# Patient Record
Sex: FEMALE | Race: WHITE | NOT HISPANIC OR LATINO | Employment: OTHER | URBAN - METROPOLITAN AREA
[De-identification: names, ages, dates, MRNs, and addresses within clinical notes are randomized per-mention and may not be internally consistent; named-entity substitution may affect disease eponyms.]

---

## 2018-11-18 PROBLEM — M54.6 THORACIC SPINE PAIN: Status: ACTIVE | Noted: 2018-11-18

## 2018-11-18 PROBLEM — M54.50 LUMBAR PAIN: Status: ACTIVE | Noted: 2018-11-18

## 2018-12-07 PROBLEM — R91.1 LUNG NODULE: Status: ACTIVE | Noted: 2018-01-01

## 2018-12-07 PROBLEM — E04.1 THYROID NODULE: Status: ACTIVE | Noted: 2018-12-07

## 2018-12-07 PROBLEM — G25.0 ESSENTIAL TREMOR: Status: ACTIVE | Noted: 2018-12-07

## 2018-12-07 PROBLEM — D68.51 FACTOR 5 LEIDEN MUTATION, HETEROZYGOUS (HCC): Status: ACTIVE | Noted: 2018-12-07

## 2018-12-07 PROBLEM — I47.29 NSVT (NONSUSTAINED VENTRICULAR TACHYCARDIA) (HCC): Status: ACTIVE | Noted: 2018-01-01

## 2018-12-07 PROBLEM — Z79.01 ANTICOAGULATED: Status: ACTIVE | Noted: 2018-12-07

## 2019-03-20 PROBLEM — H50.10 EXOTROPIA: Status: ACTIVE | Noted: 2017-10-11

## 2019-03-20 PROBLEM — I95.9 HYPOTENSION: Status: ACTIVE | Noted: 2018-04-25

## 2019-03-20 PROBLEM — H54.2X11: Status: ACTIVE | Noted: 2018-04-02

## 2019-04-10 PROBLEM — I95.9 HYPOTENSION: Status: RESOLVED | Noted: 2018-04-25 | Resolved: 2019-04-10

## 2020-03-09 PROBLEM — R91.1 LUNG NODULE: Status: RESOLVED | Noted: 2018-01-01 | Resolved: 2020-03-09

## 2020-03-09 PROBLEM — J44.9 COPD MIXED TYPE (HCC): Status: ACTIVE | Noted: 2020-03-09

## 2020-03-13 ENCOUNTER — TELEPHONE (OUTPATIENT)
Dept: HEALTH INFORMATION MANAGEMENT | Facility: OTHER | Age: 70
End: 2020-03-13

## 2020-03-13 ENCOUNTER — TELEPHONE (OUTPATIENT)
Dept: MEDSURG UNIT | Facility: MEDICAL CENTER | Age: 70
End: 2020-03-13

## 2020-03-13 NOTE — TELEPHONE ENCOUNTER
1. Caller Name: Antonette Rivers                      Call Back Number: [phone]  [facility] PCP or Specialty Provider: Yes         2.  Does patient have any active symptoms of respiratory illness (fever OR cough OR shortness of breath)? Yes, the patient reports the following respiratory symptoms: cough.  Had a viral infection d/c 7 days ago. Still having a cough  3.  Does patient have any comoribidities? COPD    4.  In the last 30 days, has the patient traveled outside of the country OR in a high risk area within the [place] OR have any known contact with someone who has or is suspected to have COVID-19?  No.    5. Disposition: Cleared by RN Triage; OK to keep/schedule appointment Ok to reschedule two weeks from today    Note routed to PCP: DONNY only.

## 2020-03-27 ENCOUNTER — APPOINTMENT (OUTPATIENT)
Dept: CARDIOLOGY | Facility: MEDICAL CENTER | Age: 70
End: 2020-03-27
Payer: MEDICARE

## 2020-05-31 PROBLEM — J44.1 CHRONIC OBSTRUCTIVE PULMONARY DISEASE WITH ACUTE EXACERBATION (HCC): Status: ACTIVE | Noted: 2020-05-31

## 2020-05-31 PROBLEM — R51.9 HEADACHE: Status: ACTIVE | Noted: 2020-05-31

## 2020-05-31 PROBLEM — M54.50 LUMBAR PAIN: Status: RESOLVED | Noted: 2018-11-18 | Resolved: 2020-05-31

## 2020-05-31 PROBLEM — I95.9 HYPOTENSION: Status: RESOLVED | Noted: 2018-04-25 | Resolved: 2020-05-31

## 2020-06-02 PROBLEM — J44.1 CHRONIC OBSTRUCTIVE PULMONARY DISEASE WITH ACUTE EXACERBATION (HCC): Status: RESOLVED | Noted: 2020-05-31 | Resolved: 2020-06-02

## 2020-06-05 PROBLEM — R51.9 HEADACHE: Status: RESOLVED | Noted: 2020-05-31 | Resolved: 2020-06-05

## 2020-06-05 PROBLEM — I95.9 ARTERIAL HYPOTENSION: Status: ACTIVE | Noted: 2020-06-05

## 2020-08-06 PROBLEM — Z20.822 SUSPECTED COVID-19 VIRUS INFECTION: Status: ACTIVE | Noted: 2020-08-06

## 2020-08-06 PROBLEM — R53.1 WEAKNESS: Status: ACTIVE | Noted: 2020-08-06

## 2020-08-06 PROBLEM — R19.7 DIARRHEA: Status: ACTIVE | Noted: 2020-08-06

## 2020-08-06 PROBLEM — R00.2 PALPITATIONS: Status: ACTIVE | Noted: 2020-08-06

## 2020-08-06 PROBLEM — R05.9 COUGH: Status: ACTIVE | Noted: 2020-08-06

## 2020-08-07 PROBLEM — Z20.822 SUSPECTED COVID-19 VIRUS INFECTION: Status: RESOLVED | Noted: 2020-08-06 | Resolved: 2020-08-07

## 2020-08-07 PROBLEM — R19.7 DIARRHEA: Status: RESOLVED | Noted: 2020-08-06 | Resolved: 2020-08-07

## 2020-08-07 PROBLEM — R05.9 COUGH: Status: RESOLVED | Noted: 2020-08-06 | Resolved: 2020-08-07

## 2020-08-07 PROBLEM — R53.1 WEAKNESS: Status: RESOLVED | Noted: 2020-08-06 | Resolved: 2020-08-07

## 2020-08-14 PROBLEM — R09.02 HYPOXIA: Status: ACTIVE | Noted: 2020-08-14

## 2020-09-04 ENCOUNTER — OFFICE VISIT (OUTPATIENT)
Dept: CARDIOLOGY | Facility: MEDICAL CENTER | Age: 70
End: 2020-09-04
Payer: MEDICARE

## 2020-09-04 VITALS
HEART RATE: 60 BPM | BODY MASS INDEX: 25.31 KG/M2 | WEIGHT: 167 LBS | SYSTOLIC BLOOD PRESSURE: 114 MMHG | DIASTOLIC BLOOD PRESSURE: 70 MMHG | HEIGHT: 68 IN | OXYGEN SATURATION: 95 %

## 2020-09-04 DIAGNOSIS — I47.29 NSVT (NONSUSTAINED VENTRICULAR TACHYCARDIA) (HCC): ICD-10-CM

## 2020-09-04 LAB — EKG IMPRESSION: NORMAL

## 2020-09-04 PROCEDURE — 99205 OFFICE O/P NEW HI 60 MIN: CPT | Performed by: INTERNAL MEDICINE

## 2020-09-04 PROCEDURE — 93000 ELECTROCARDIOGRAM COMPLETE: CPT | Performed by: INTERNAL MEDICINE

## 2020-09-04 ASSESSMENT — FIBROSIS 4 INDEX: FIB4 SCORE: 3.26

## 2020-09-04 NOTE — PROGRESS NOTES
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 Has history of prior VTE and factor V leiden on OAC. She has symptomatic PVCs. Seen prior EP in [facility] and locally Dr. Garcia here. In the past discussed PVC ablation but was given 50/50 chance of success due to \"LVOT\" location. Also placed on mexitil here. She says this made her too nauseated and had symptomatic nupur. On high dose metop now. Her symptoms are mainly fatigue and palpitations. She was referred for a third opinion regarding PVC ablation.     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 ([facility]) 2015   • Bowman's esophagus    • BCC (basal cell carcinoma) 11/06/2015   • Bowel habit changes     diarrhea since cholecystectomy 1.5 years ago   • Breast cancer metastasized to axillary lymph node, left (HCC)    • Cancer (HCC)     left breast with axillary node involvement, cervical   • Cataract    • Cervical cancer ([facility])    • 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   • Cholelithiases 2018   • Chronic low back pain 2016   • Clotting disorder ([facility])    • COPD (chronic obstructive pulmonary disease) ([facility]) 2018   • Depression    • Diplopia 10/11/2017   • DVT of lower extremity (deep venous thrombosis) ([facility]) 2015   • Elevated serum " creatinine 2018   • Erythrocytosis 2018   • Essential tremor 12/7/2018   • Exotropia 2017   • Factor 5 Leiden mutation, heterozygous ([facility]) 12/7/2018   • Family history of melanoma 11/06/2015   • GERD (gastroesophageal reflux disease)    • Goiter    • H/O total hysterectomy with bilateral salpingo-oophorectomy (BSO) 2015   • Head ache    • Heart burn    • Heart murmur    • History of breast cancer in female 2015   • History of cervical cancer 2006   • History of fibula fracture 2010   • History of shingles 1985   • Hypertension    • Hypertropia of right eye 10/11/2017   • Indigestion    • Lattice degeneration of peripheral retina 2016   • Lumbar disc disease with radiculopathy 2016   • Lung nodule 2018   • Lymphedema of left arm 2015   • Migraine    • Non-melanoma skin cancer 2016   • Nonexudative senile macular degeneration of retina 2015   • NSVT (nonsustained ventricular tachycardia) ([facility]) 2018   • Nystagmus 10/11/2017   • Osteoarthritis of lumbar spine 2016   • Osteoporosis    • Paving stone degeneration of peripheral retina 2015   • Peripheral retinal hole of right eye 2016   • Plantar fasciitis of right foot 11/06/2015   • Pre-syncope 2018   • Pseudophakia, both eyes 11/09/2017   • PVC (premature ventricular contraction) 2015   • S/P bilateral mastectomy 2007   • S/P laparoscopic cholecystectomy 2018   • Sacroiliac joint pain 2016   • SVT (supraventricular tachycardia) ([facility]) 2018   • Thyroid nodule 12/7/2018   • Tubular adenoma of colon 2015   • Vaginal atrophy 03/01/2017   • Vitreous hemorrhage ([facility]) 2016   • Wrist sprain 05/11/2016       Past Surgical History:   Procedure Laterality Date   • COLONOSCOPY - ENDO  9/19/2019    Procedure: COLONOSCOPY;  Surgeon: Louis Tomlin M.D.;  Location: SURGERY [facility] ORS;  Service: Gastroenterology   • NV DEST,PARAVERTEBRAL,C/T,ADDL Bilateral 12/13/2018    Procedure: RADIO FREQUENCY NERVE ABLATION;  Surgeon: Edna Mcginnis M.D.;  Location: SURGERY [facility] ORS;   Service: Orthopedics   • CHOLECYSTECTOMY  2018   • HYSTERECTOMY RADICAL      for cervical cancer   • HYSTERECTOMY, TOTAL ABDOMINAL     • MASTECTOMY BILATERAL SUBQ         Social History     Socioeconomic History   • Marital status:      Spouse name: Not on file   • Number of children: Not on file   • Years of education: Not on file   • Highest education level: Not on file   Occupational History   • Not on file   Social Needs   • Financial resource strain: Not on file   • Food insecurity     Worry: Never true     Inability: Never true   • Transportation needs     Medical: No     Non-medical: No   Tobacco Use   • Smoking status: Former Smoker     Packs/day: 1.00     Years: 30.00     Pack years: 30.00     Types: Cigarettes     Quit date: 2006     Years since quittin.6   • Smokeless tobacco: Never Used   Substance and Sexual Activity   • Alcohol use: Yes     Frequency: 4 or more times a week     Drinks per session: 1 or 2     Binge frequency: Never   • Drug use: No   • Sexual activity: Never   Lifestyle   • Physical activity     Days per week: Not on file     Minutes per session: Not on file   • Stress: Not on file   Relationships   • Social connections     Talks on phone: Not on file     Gets together: Not on file     Attends Jew service: Not on file     Active member of club or organization: Not on file     Attends meetings of clubs or organizations: Not on file     Relationship status: Not on file   • Intimate partner violence     Fear of current or ex partner: Not on file     Emotionally abused: Not on file     Physically abused: Not on file     Forced sexual activity: Not on file   Other Topics Concern   • Not on file   Social History Narrative    2018: Moved to [place] [facility] . . Adult son lives in [place]. Two adult children, one  child. 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        Family History   Problem Relation Age of Onset   •  Arthritis Mother    • Heart Disease Mother    • Cancer Father         pancreatic   • Alcohol/Drug Father        Allergies   Allergen Reactions   • Cephalexin      Rash     • Ciprofloxacin      Rash     • Fluocinolone      itching   • Neomycin-Polymyxin-Dexameth      conjunctvitis   • Povidone Iodine Rash     Rash. Not a food related allergy per patient.    • Procainamide      Drug induced lupus       Current Outpatient Medications   Medication Sig Dispense Refill   • lisinopril (PRINIVIL) 20 MG Tab TAKE TWO TABLETS BY MOUTH EVERY DAY (Patient taking differently: Take 10 mg by mouth every day.) 180 Tab 3   • metoprolol (TOPROL-XL) 200 MG XL tablet Take 1 Tab by mouth every day for 30 days. 30 Tab 0   • topiramate (TOPAMAX) 25 MG Tab Take 2 Tabs by mouth 2 times a day for 120 days. 120 Tab 3   • warfarin (COUMADIN) 2 MG Tab Use as directed by physician (currently taking 11 mg per week) 132 Tab 1   • budesonide (PULMICORT) 0.5 MG/2ML Suspension 2 mL by Nebulization route 2 times a day. 60 Bullet 0   • Melatonin 2.5 MG Cap Take  by mouth.     • cyanocobalamin (VITAMIN B-12) 1000 MCG/ML Solution 1 mL by Intramuscular route every 30 days. 3 Vial 3   • Multiple Vitamins-Minerals (OCUVITE PO) Take  by mouth.     • Calcium Carb-Cholecalciferol (OYSTER SHELL CALCIUM/VITAMIN D) 250-125 MG-UNIT Tab tablet Take 1 Tab by mouth every day.     • triamcinolone acetonide (KENALOG) 0.1 % Cream Apply 1 Application to affected area(s) every day. (Patient not taking: Reported on 8/14/2020) 1 Tube 0   • JANTOVEN 1 MG Tab TAKE ONE TABLET BY MOUTH EVERY DAY (Patient not taking: Reported on 9/4/2020) 90 Tab 0   • amLODIPine (NORVASC) 5 MG Tab Take 1 Tab by mouth every day. (Patient not taking: Reported on 9/4/2020) 90 Tab 3   • Arformoterol Tartrate (BROVANA) 15 MCG/2ML Nebu Soln Inhale 2 mL by mouth 2 Times a Day. 60 mL 0   • docusate sodium (COLACE) 100 MG Cap Take 300 mg by mouth every bedtime.     • Beta Carotene 19482 UNIT Cap Take 1  "tablet by mouth every day.       No current facility-administered medications for this visit.        Physical Exam:  Vitals:    09/04/20 1342   BP: 114/70   BP Location: Left arm   Patient Position: Sitting   BP Cuff Size: Adult   Pulse: 60   SpO2: 95%   Weight: 75.8 kg (167 lb)   Height: 1.727 m (5' 8\")     General appearance: NAD, conversant, tremors  Eyes: anicteric sclerae, no lid-lag; PERRLA  HENT: Atraumatic; moist mucous membranes, no ulcerations  Neck: Trachea midline; FROM, supple, no thyromegaly  Lungs: CTA, with normal respiratory effort and no intercostal retractions  CV: irregular, no MRGs, no JVD  Abdomen: Soft, non-tender; normal bowel sounds, no HSM  Extremities: No peripheral edema. No clubbing or cyanosis.  Skin: Normal temperature, turgor and texture; no rash or ulcers  Psych: Appropriate affect, alert and oriented to person, place and time      Data:  Labs reviewed    Prior echo/stress reviewed:  Normal function    Outside records reviewed, including EKGs    EKG interpreted by me:  Sinus, PVCs, poor baseline due to tremors    Impression/Plan:  1. NSVT (nonsustained ventricular tachycardia) ([facility])  EKG    CL-EP ABLATION VENTRICULAR TACHYCARDIA     -Today her PVCs have RBBB with superior access and late reverse precordial transition  -Sounds like LV cavity PVC similar to what Dr. Garcia described in his note  -Prior EKGs reviewed, inferior axis PVC with LBBB and early precordial transition more consistent with LVOT which was what was seen in [facility]  -I think a chance at ablation not unreasonable though with multiple morphologies I think success rate less  -Trouble also is we are having a hard time with getting quality EKG tracings due to tremor  -This would make it extremely difficult to map the PVCs without reproducible surface PVC morphologies for SIDNEY an morphology matching  -She wants to proceed and I will schedule with anesthesia and possible paralytics to accommodate mapping though this " may suppress PVCs which I warned her  -Risks/benefits/alternatives discussed and all questions answered    Jesus Tobias MD

## 2020-09-09 ENCOUNTER — TELEPHONE (OUTPATIENT)
Dept: CARDIOLOGY | Facility: MEDICAL CENTER | Age: 70
End: 2020-09-09

## 2020-09-09 NOTE — TELEPHONE ENCOUNTER
Dr. Tobias,    I recvd a voice message from this patient to schedule an ablation. Are there any medication you would like this patient to hold for the VT ablation w/anesthesia?    Thank You,  Luciana

## 2020-09-11 NOTE — TELEPHONE ENCOUNTER
Jesus Tobias M.D.  Luciana Barrios, Med Ass't   Caller: Unspecified (2 days ago,  4:02 PM)             No meds to hold

## 2020-09-11 NOTE — TELEPHONE ENCOUNTER
Dr. Tobias,    I scheduled this patient for 12-24-20 for the VT ablation.    She stated that you told her to hold her Betablocker for 5 days prior. l just wanted to confirm if you wanted her to hold this medication or continue it?    Also, she stated that you talked about a pacemaker insert as well. Was that incase the ablation didn't work or do I need to schedule her for a PM insert as well?    Please advise.    Thank You,  Luciana

## 2020-09-11 NOTE — TELEPHONE ENCOUNTER
Recvd call from patient - she has changed her mind. She does not want to have this procedure done on Jalyn Burns. I will call her once the schedule come out for the new year to schedule in January.

## 2020-09-15 PROBLEM — R20.0 HAND NUMBNESS: Status: ACTIVE | Noted: 2020-09-15

## 2020-10-21 NOTE — TELEPHONE ENCOUNTER
Dr. Tobias,    Per the patient - you advised her to hold the her betablocker for 5 days.     Can you please confirm if this patient needs to hold any medications for the VT ablation.    Also, she asked about the pacemaker. Did you want her scheduled for a PM insert as well?    Thank You,  Luciana

## 2020-10-22 NOTE — TELEPHONE ENCOUNTER
Patient scheduled for VT ablation w/anesthesia on 2-3-21 at [facility] with Dr. Tobias.

## 2020-10-22 NOTE — TELEPHONE ENCOUNTER
BLACK Rivers, Med Ass't   Caller: Unspecified (1 month ago)             Yes hold metop 5 days. No pacemaker yet.

## 2020-10-27 PROBLEM — R55 SYNCOPE: Status: ACTIVE | Noted: 2020-10-27

## 2020-10-29 PROBLEM — R55 SYNCOPE: Status: RESOLVED | Noted: 2020-10-27 | Resolved: 2020-10-29

## 2020-11-10 PROBLEM — R63.5 UNEXPLAINED WEIGHT GAIN: Status: ACTIVE | Noted: 2020-11-10

## 2020-12-08 PROBLEM — R63.4 UNEXPLAINED WEIGHT LOSS: Status: ACTIVE | Noted: 2020-12-08

## 2020-12-08 PROBLEM — R63.5 UNEXPLAINED WEIGHT GAIN: Status: RESOLVED | Noted: 2020-11-10 | Resolved: 2020-12-08

## 2020-12-10 ENCOUNTER — HOSPITAL ENCOUNTER (OUTPATIENT)
Facility: MEDICAL CENTER | Age: 70
End: 2020-12-10
Attending: INTERNAL MEDICINE | Admitting: INTERNAL MEDICINE
Payer: MEDICARE

## 2020-12-15 ENCOUNTER — ANTICOAGULATION MONITORING (OUTPATIENT)
Dept: VASCULAR LAB | Facility: MEDICAL CENTER | Age: 70
End: 2020-12-15

## 2020-12-15 NOTE — PROGRESS NOTES
Discharged from [facility].  Pt managed by her PCP  Chantel Rhodes, Clinical Pharmacist, CDE, CACP

## 2020-12-23 ENCOUNTER — TELEPHONE (OUTPATIENT)
Dept: CARDIOLOGY | Facility: MEDICAL CENTER | Age: 70
End: 2020-12-23

## 2020-12-23 NOTE — TELEPHONE ENCOUNTER
Called and spoke with patient - she is on Dr. Tobias's cancellation list. I have a cancellation for 12-29. She declined moving the procedure up to this date. I told her I would keep her on the cancellation list for any other cancellations. Patient requesting to come off of this list.

## 2021-01-06 PROBLEM — R91.1 PULMONARY NODULE: Status: ACTIVE | Noted: 2021-01-06

## 2021-01-11 ENCOUNTER — TELEPHONE (OUTPATIENT)
Dept: CARDIOLOGY | Facility: MEDICAL CENTER | Age: 71
End: 2021-01-11

## 2021-01-11 NOTE — TELEPHONE ENCOUNTER
NM: Vera    [facility]:    PH: [phone]  EXT: 1 AND 1    PT NM: RiversAntonette   : [birth year]   REG DR: Dr Tobias   RE: Needs to cancel procedure  2/3/2021      --------------------------------------  Message History  Account: [id number]  Taken:  2021 12:20p   Serial#: [id number]

## 2021-01-11 NOTE — TELEPHONE ENCOUNTER
Per patients request she is cancelling procedure with Dr. Tobias on 2-3-21 due to moving out of the area.

## 2021-02-03 ENCOUNTER — APPOINTMENT (OUTPATIENT)
Dept: CARDIOLOGY | Facility: MEDICAL CENTER | Age: 71
End: 2021-02-03
Attending: INTERNAL MEDICINE
Payer: MEDICARE